# Patient Record
Sex: MALE | Race: WHITE | URBAN - METROPOLITAN AREA
[De-identification: names, ages, dates, MRNs, and addresses within clinical notes are randomized per-mention and may not be internally consistent; named-entity substitution may affect disease eponyms.]

---

## 2018-08-16 ENCOUNTER — OFFICE VISIT (OUTPATIENT)
Dept: NEUROLOGY | Age: 38
End: 2018-08-16

## 2018-08-16 VITALS
RESPIRATION RATE: 18 BRPM | BODY MASS INDEX: 31.02 KG/M2 | HEART RATE: 73 BPM | SYSTOLIC BLOOD PRESSURE: 157 MMHG | DIASTOLIC BLOOD PRESSURE: 91 MMHG | WEIGHT: 193 LBS | OXYGEN SATURATION: 98 % | HEIGHT: 66 IN

## 2018-08-16 DIAGNOSIS — G43.009 MIGRAINE WITHOUT AURA AND WITHOUT STATUS MIGRAINOSUS, NOT INTRACTABLE: Primary | ICD-10-CM

## 2018-08-16 RX ORDER — DIPHENHYDRAMINE HCL 25 MG
50 CAPSULE ORAL
COMMUNITY
End: 2022-05-18

## 2018-08-16 RX ORDER — RIZATRIPTAN BENZOATE 10 MG/1
10 TABLET ORAL
Qty: 9 TAB | Refills: 6 | Status: SHIPPED | OUTPATIENT
Start: 2018-08-16 | End: 2018-08-16

## 2018-08-16 RX ORDER — SUMATRIPTAN 100 MG/1
100 TABLET, FILM COATED ORAL
Qty: 9 TAB | Refills: 6 | Status: SHIPPED | OUTPATIENT
Start: 2018-08-16 | End: 2018-08-16

## 2018-08-16 NOTE — PATIENT INSTRUCTIONS
Patient history reviewed and patient examined. I think this is a straightforward case of migraine headaches and I do not see any need for preventative drug strategy on first encounter. Will issue standard rescue relief with the drug sumatriptan to be taken at the onset of a migraine headache. Continue to monitor headaches to increase self-awareness. Get good sleep minimize stress and continue the exercise routine and watch the nutritional status. Exam is normal and I see no need to push the testing agenda at this time. Revisit in 2 months.

## 2018-08-16 NOTE — MR AVS SNAPSHOT
[address] [address] [place] [id number] 
[phone] Patient: Jazz Moreno MRN: [id number] MMR:[birth year] Visit Information Date & Time Provider Department Dept. Phone Encounter #  
 8/16/2018  8:00 AM Marylen Gale, MD [facility] [phone] [id number] Follow-up Instructions Return in about 2 months (around 10/16/2018). Your Appointments 10/11/2018  8:40 AM  
Follow Up with Marylen Gale, MD  
[address] [facility] Appt Note: follow up headache  los  8/16/18  
 [address] [facility] [address] [address]  
[facility] 57 [address] [facility] [address] Upcoming Health Maintenance Date Due DTaP/Tdap/Td series (1 - Tdap) 7/22/2003 Influenza Age 5 to Adult 8/1/2018 Allergies as of 8/16/2018  Review Complete On: 8/16/2018 By: Marylen Gale, MD  
  
 Severity Noted Reaction Type Reactions Pcn [Penicillins] High 08/16/2018    Anaphylaxis, Swelling Current Immunizations  Never Reviewed No immunizations on file. Not reviewed this visit You Were Diagnosed With   
  
 Codes Comments Migraine without aura and without status migrainosus, not intractable    -  Primary ICD-10-CM: G43.009 ICD-9-CM: 346.10 Vitals BP Pulse Resp Height(growth percentile) Weight(growth percentile) SpO2  
 (!) 157/91 73 18 5' 6\" (1.676 m) 193 lb (87.5 kg) 98% BMI Smoking Status 31.15 kg/m2 Never Smoker Vitals History BMI and BSA Data Body Mass Index Body Surface Area  
 31.15 kg/m 2 2.02 m 2 Preferred Pharmacy Pharmacy Name Phone [facility] [place], [address] [place] AT [facility] [phone] Your Updated Medication List  
  
   
 This list is accurate as of 8/16/18  8:28 AM.  Always use your most recent med list.  
  
  
  
  
 BENADRYL 25 mg capsule Generic drug:  diphenhydrAMINE Take 50 mg by mouth every six (6) hours as needed. FIORICET PO Take  by mouth. REGLAN PO Take  by mouth. SUMAtriptan 100 mg tablet Commonly known as:  IMITREX Take 1 Tab by mouth once as needed for Migraine for up to 1 dose. Indications: Migraine TORADOL PO Take  by mouth. Prescriptions Sent to Pharmacy Refills SUMAtriptan (IMITREX) 100 mg tablet 6 Sig: Take 1 Tab by mouth once as needed for Migraine for up to 1 dose. Indications: Migraine Class: Normal  
 Pharmacy: [facility] [address] [facility] #: [phone] Route: Oral  
  
Follow-up Instructions Return in about 2 months (around 10/16/2018). Patient Instructions Patient history reviewed and patient examined. I think this is a straightforward case of migraine headaches and I do not see any need for preventative drug strategy on first encounter. Will issue standard rescue relief with the drug sumatriptan to be taken at the onset of a migraine headache. Continue to monitor headaches to increase self-awareness. Get good sleep minimize stress and continue the exercise routine and watch the nutritional status. Exam is normal and I see no need to push the testing agenda at this time. Revisit in 2 months. Introducing [facility]! Kaylie Dotson introduces Cloud Content patient portal. Now you can access parts of your medical record, email your doctor's office, and request medication refills online. 1. In your internet browser, go to [email or web address] 2. Click on the First Time User? Click Here link in the Sign In box. You will see the New Member Sign Up page. 3. Enter your Cloud Content Access Code exactly as it appears below.  You will not need to use this code after youve completed the sign-up process. If you do not sign up before the expiration date, you must request a new code. · WorkingPoint Access Code: [email or web address] Expires: 11/14/2018  8:28 AM 
 
4. Enter the last four digits of your Social Security Number (xxxx) and Date of Birth (mm/dd/yyyy) as indicated and click Submit. You will be taken to the next sign-up page. 5. Create a WorkingPoint ID. This will be your WorkingPoint login ID and cannot be changed, so think of one that is secure and easy to remember. 6. Create a WorkingPoint password. You can change your password at any time. 7. Enter your Password Reset Question and Answer. This can be used at a later time if you forget your password. 8. Enter your e-mail address. You will receive e-mail notification when new information is available in [address]. 9. Click Sign Up. You can now view and download portions of your medical record. 10. Click the Download Summary menu link to download a portable copy of your medical information. If you have questions, please visit the Frequently Asked Questions section of the WorkingPoint website. Remember, WorkingPoint is NOT to be used for urgent needs. For medical emergencies, dial 911. Now available from your iPhone and Android! Please provide this summary of care documentation to your next provider. If you have any questions after today's visit, please call [phone].

## 2022-05-18 ENCOUNTER — OFFICE VISIT (OUTPATIENT)
Dept: NEUROLOGY | Age: 42
End: 2022-05-18
Payer: COMMERCIAL

## 2022-05-18 VITALS
OXYGEN SATURATION: 99 % | WEIGHT: 195 LBS | RESPIRATION RATE: 16 BRPM | SYSTOLIC BLOOD PRESSURE: 132 MMHG | HEART RATE: 66 BPM | DIASTOLIC BLOOD PRESSURE: 84 MMHG | BODY MASS INDEX: 31.47 KG/M2

## 2022-05-18 DIAGNOSIS — R94.02 ABNORMAL BRAIN SCAN: Primary | ICD-10-CM

## 2022-05-18 PROCEDURE — 99204 OFFICE O/P NEW MOD 45 MIN: CPT | Performed by: SPECIALIST

## 2022-05-18 RX ORDER — MELOXICAM 15 MG/1
TABLET ORAL
COMMUNITY
Start: 2022-05-02

## 2022-05-18 RX ORDER — DIAZEPAM 5 MG/1
TABLET ORAL
Qty: 2 TABLET | Refills: 0 | Status: SHIPPED | OUTPATIENT
Start: 2022-05-18 | End: 2022-06-16 | Stop reason: SDUPTHER

## 2022-05-18 RX ORDER — RIZATRIPTAN BENZOATE 10 MG/1
TABLET ORAL
COMMUNITY
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
## 2022-05-18 NOTE — PATIENT INSTRUCTIONS
Patient history viewed patient examined. Interesting case with nonspecific MRI finding but no history nor strict exam findings that go directly to a straightforward case of MS. Will repeat the MRI with contrast at the same imaging center and also do an MRI of the neck with and without dye as these represent the 2 most likely places of MS discovery on imaging. Would also like to get the blood results from primary care in case we need to do some additional investigation. We will get the information from the ENT and eye doctor to keep our records complete. Unfortunately my revisit schedule is horrendous and probably will be catching up with each other in about 2 months.

## 2022-05-18 NOTE — PROGRESS NOTES
MRI done [facility] April   Found 2 spots, 1 left and 1 in the back of the head  Has been having dizzy spells, last for a couple seconds, no triggers known  Nausea  Has seen ENT

## 2022-05-18 NOTE — LETTER
5/18/2022    Patient: Pako Reid   YOB: 1980   Date of Visit: 5/18/2022     Armida Anna, [facility] [address] [id number]  Via Fax: [phone]    Dear ANYI Sandra,      Thank you for referring Mr. Tara Johnson to [facility] for evaluation. My notes for this consultation are attached. If you have questions, please do not hesitate to call me. I look forward to following your patient along with you.       Sincerely,    Rigo Chamberlain MD

## 2022-05-19 ENCOUNTER — TELEPHONE (OUTPATIENT)
Dept: NEUROLOGY | Age: 42
End: 2022-05-19

## 2022-05-19 NOTE — TELEPHONE ENCOUNTER
Faxed imaging order for MRI and progress note to [facility] with [facility] to get auth and schedule with [place] imaging

## 2022-05-19 NOTE — TELEPHONE ENCOUNTER
Patient calling requesting his MRI referral sent to [address] [place] [address] on [address]. Please call him if you have any questions.

## 2022-05-24 DIAGNOSIS — R94.02 ABNORMAL BRAIN SCAN: ICD-10-CM

## 2022-05-24 NOTE — TELEPHONE ENCOUNTER
Patient called regarding an order for an MRI at [address] [place] [address]. When patient called them he was told the order was never sent over. Please contact.

## 2022-06-06 ENCOUNTER — TELEPHONE (OUTPATIENT)
Dept: NEUROLOGY | Age: 42
End: 2022-06-06

## 2022-06-06 DIAGNOSIS — R94.02 ABNORMAL BRAIN SCAN: Primary | ICD-10-CM

## 2022-06-06 NOTE — TELEPHONE ENCOUNTER
Mandy Lechuga called from [facility] imaging and patient has a MRI BRAIN scheduled she needs a new order for MRI BRAIN with and without contrast, if you put the order in and let me know I will fax it to her at [phone].  Thanks Gerson Hazel

## 2022-06-16 NOTE — TELEPHONE ENCOUNTER
Requested Prescriptions     Pending Prescriptions Disp Refills    diazePAM (Valium) 5 mg tablet 2 Tablet 0     Sig: Can take 1 or 2 by mouth prior to scan if needed. He will need a . .. Indications: anxious     Pt req meds for MRI. Please call back.

## 2022-06-20 RX ORDER — DIAZEPAM 5 MG/1
TABLET ORAL
Qty: 2 TABLET | Refills: 0 | Status: SHIPPED | OUTPATIENT
Start: 2022-06-20
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
